# Patient Record
Sex: FEMALE | Race: WHITE | NOT HISPANIC OR LATINO | URBAN - METROPOLITAN AREA
[De-identification: names, ages, dates, MRNs, and addresses within clinical notes are randomized per-mention and may not be internally consistent; named-entity substitution may affect disease eponyms.]

---

## 2023-03-19 ENCOUNTER — EMERGENCY (EMERGENCY)
Facility: HOSPITAL | Age: 33
LOS: 1 days | Discharge: HOME | End: 2023-03-19
Admitting: EMERGENCY MEDICINE
Payer: COMMERCIAL

## 2023-03-19 VITALS
OXYGEN SATURATION: 97 % | WEIGHT: 190.04 LBS | HEIGHT: 65 IN | HEART RATE: 86 BPM | RESPIRATION RATE: 17 BRPM | TEMPERATURE: 98 F | SYSTOLIC BLOOD PRESSURE: 129 MMHG | DIASTOLIC BLOOD PRESSURE: 89 MMHG

## 2023-03-19 PROCEDURE — 73610 X-RAY EXAM OF ANKLE: CPT | Mod: 26,RT

## 2023-03-19 PROCEDURE — 99284 EMERGENCY DEPT VISIT MOD MDM: CPT

## 2023-03-19 PROCEDURE — 73610 X-RAY EXAM OF ANKLE: CPT

## 2023-03-19 PROCEDURE — 99283 EMERGENCY DEPT VISIT LOW MDM: CPT | Mod: 25

## 2023-03-19 RX ORDER — IBUPROFEN 200 MG
600 TABLET ORAL ONCE
Refills: 0 | Status: COMPLETED | OUTPATIENT
Start: 2023-03-19 | End: 2023-03-19

## 2023-03-19 RX ORDER — ONDANSETRON 8 MG/1
8 TABLET, FILM COATED ORAL ONCE
Refills: 0 | Status: COMPLETED | OUTPATIENT
Start: 2023-03-19 | End: 2023-03-19

## 2023-03-19 RX ADMIN — Medication 600 MILLIGRAM(S): at 12:08

## 2023-03-19 RX ADMIN — ONDANSETRON 8 MILLIGRAM(S): 8 TABLET, FILM COATED ORAL at 12:09

## 2023-03-19 NOTE — ED PROVIDER NOTE - CARE PROVIDER_API CALL
Dylan Molina)  Orthopaedic Surgery  [address] [place] [address]  Phone: [phone]  Fax: [phone]  Follow Up Time:

## 2023-03-19 NOTE — ED PROVIDER NOTE - NSFOLLOWUPINSTRUCTIONS_ED_ALL_ED_FT
Take tylenol 650mg or motrin 400-800mg as needed every 4-6 hours for pain.   REST- Rest your hurting/injured joint or extremity to decrease pain and swelling for 24-48 hours    ICE- Apply ice to area of pain to decreased inflammation and pain, put towel/barrier between ice and skin. You can keep ice on for 20 minutes at a time 4-8 times daily   COMPRESSION- Wear ace wrap or brace for support to reduce swelling.  Make sure not to wrap too tight, loosen if skin feeling numb/tingling or skin turns blue   ELEVATION- Elevate hurting/injured area 6 or more inches about level of heart to decrease swelling/inflammation.  Use pillow under joint to elevate area    [facility] (Thursday afternoons) - call [phone] to schedule  You may call our referrals coordinator at [phone] Monday to Friday 11am-7pm for assistance with making an appointment      Nondisplaced Fibular Ankle Fracture Treated With Immobilization       A nondisplaced fibular ankle fracture is a simple break of the bottom of the fibula. The fibula is the smaller of the two bones in the lower leg and is on the outer side of the leg. In a nondisplaced fracture, the pieces of the broken bone line up with each other and are not out of place. This condition usually does not need surgery and can be treated with a splint, cast, or walking boot.      What are the causes?    This condition may be caused by:  •A hard, direct hit or injury to the side of the leg.      •A powerful twisting or rotating movement.      •Rolling the ankle.      •Falling or tripping.        What increases the risk?    The following factors may make you more likely to develop this condition:  •Playing sports that involve a lot of running and pivoting, such as basketball.      •Playing impact sports, such as football or soccer.      •Having diabetes.      •Having a history of ankle fractures.      •Obesity.        What are the signs or symptoms?    Symptoms of this condition include:  •Severe pain that begins right away after your injury.      •Bruising.      •Swelling.      •Being unable to support your body weight with (unable to put weight on) your injured ankle.      •An ankle that is tender to the touch.        How is this diagnosed?    This condition is diagnosed based on:  •Your medical history.      •A physical exam.    •Imaging tests to confirm the fracture and to check the extent of the injury. These tests may include:  •X-rays.      •A stress X-ray. During this test, your health care provider will put pressure on your ankle while taking an X-ray. This will help to determine whether your ankle is stable.      •CT scan.      •MRI.          How is this treated?    This condition may be treated with:  •A splint.      •Icing and raising (elevating) the ankle.      •A cast.      •A removable cast or walking boot.      •Crutches. You may need these to help you walk.      •Taking medicine to relieve pain.        Follow these instructions at home:    If you have a splint, removable cast, or boot:     •Wear the splint, cast, or boot as told by your health care provider. Remove it only as told by your health care provider.       •Loosen it if your toes tingle, become numb, or turn cold and blue.      •Keep it clean and dry.      If you have a non-removable cast:     • Do not put pressure on any part of the cast until it is fully hardened. This may take several hours.      • Do not stick anything inside the cast to scratch your skin. Doing that increases your risk of infection.      •Check the skin around the cast every day. Tell your health care provider about any concerns.      •You may put lotion on dry skin around the edges of the cast. Do not put lotion on the skin underneath the cast.       •Keep the cast clean and dry.      Bathing     • Do not take baths, swim, or use a hot tub until your health care provider approves. Ask your health care provider if you may take showers. You may only be allowed to take sponge baths.    •If the splint, cast, or walking boot is not waterproof:  •Do not let it get wet.      •Cover it with a watertight covering when you take a bath or shower.          Managing pain, stiffness, and swelling    •If directed, put ice on the injured area. To do this:  •If you have a removable splint, cast, or boot, remove it as told by your health care provider.      •Put ice in a plastic bag.      •Place a towel between your skin and the bag, or between your cast and the bag.      •Leave the ice on for 20 minutes, 2–3 times a day.      •Remove the ice if your skin turns bright red. This is very important. If you cannot feel pain, heat, or cold, you have a greater risk of damage to the area.        •Move your toes often to reduce stiffness and swelling.      •Elevate the injured area above the level of your heart while you are sitting or lying down.      Activity     • Do not use the injured leg to support your body weight until your health care provider says that you can. Use crutches as told by your health care provider.      •Resume walking without crutches as told.      •Do exercises and stretches as told by your health care provider.        General instructions      •Take over-the-counter and prescription medicines only as told by your health care provider.    •Ask your health care provider if the medicine prescribed to you:  •Requires you to avoid driving or using machinery.    •Can cause constipation. You may need to take these actions to prevent or treat constipation:  •Take over-the-counter or prescription medicines.      •Eat foods that are high in fiber, such as beans, whole grains, and fresh fruits and vegetables.      •Limit foods that are high in fat and processed sugars, such as fried or sweet foods.          •Ask your health care provider when it is safe to drive if you have a splint, cast, or boot on your ankle.      • Do not use any products that contain nicotine or tobacco, such as cigarettes, e-cigarettes, and chewing tobacco. These can delay bone healing. If you need help quitting, ask your health care provider.      •Keep all follow-up visits. This is important.        Contact a health care provider if:    •Your cast gets damaged or it breaks.      •Your pain does not get better with medicine.        Get help right away if:    •You develop severe pain or more swelling in your ankle, leg, or foot that cannot be controlled with medicines.      •Your skin or nails below the injury turn blue or gray, feel cold, or become numb.      •The skin under your cast burns or stings.      •There is a bad smell or pus coming from under the cast.      •You cannot move your toes.        Summary    •A nondisplaced fibular ankle fracture is a simple break of the bottom of a bone in the lower leg (fibula).      •This condition may be treated with a splint, icing and elevation, a cast, or a removable cast or walking boot. You may also need crutches to help you walk while your ankle heals.      •To help manage pain, stiffness, and swelling, put ice on the injured area as directed by your health care provider.      •You should not use the injured leg to support your body weight until your health care provider says that you can. Use crutches as told by your health care provider.      This information is not intended to replace advice given to you by your health care provider. Make sure you discuss any questions you have with your health care provider.

## 2023-03-19 NOTE — ED PROVIDER NOTE - CLINICAL SUMMARY MEDICAL DECISION MAKING FREE TEXT BOX
33 F pmh Crohns on humira p/w R ankle pain s/p injury.  on exam RLE: + swelling and ttp over lateral malleolus, limited ROM ankle 2/2 pain, no calf ttp, achilles intact, FROM knee/digits, DP/PT pulse 2+, SILT.  xray w/ nondisplaced distal fibular fracture, mortise intact.  placed in CAM/given crutches for NWB and educated on RICE.  f/u with ortho outpt.  discussed strict return parameters

## 2023-03-19 NOTE — ED ADULT NURSE NOTE - OBJECTIVE STATEMENT
33F presents c/o right ankle pain. pt states she stepped off a curb and rolled her ankle. denies LOC or head injury. ankle with swelling and minimal bruising noted. +pulses and +sensation. pt denies previous injury to that ankle. pt speaking in clear, complete sentences.

## 2023-03-19 NOTE — ED PROVIDER NOTE - PHYSICAL EXAMINATION
Gen: well appearing, no acute distress  Skin: warm/dry, no rash noted  Resp: breathing comfortably, speaking in full sentences, no dyspnea  RLE: + swelling and ttp over lateral malleolus, limited ROM ankle 2/2 pain, no calf ttp, achilles intact, FROM knee/digits, DP/PT pulse 2+, SILT   Neuro: alert/oriented, ambulatory

## 2023-03-19 NOTE — ED PROVIDER NOTE - PATIENT PORTAL LINK FT
You can access the FollowMyHealth Patient Portal offered by [facility] by registering at the following website: [email or web address]. By joining [facility]’s FollowMyHealth portal, you will also be able to view your health information using other applications (apps) compatible with our system.

## 2023-03-19 NOTE — ED ADULT NURSE REASSESSMENT NOTE - NS ED NURSE REASSESS COMMENT FT1
CAM boot applied to right foot/leg. pt provided with size appropriate crutches and educated on instructions for ambulation. pt able to teach back appropriate ambulation with assistive device.

## 2023-03-19 NOTE — ED PROVIDER NOTE - OBJECTIVE STATEMENT
33 F pmh Crohns on humira p/w R ankle pain s/p injury.  Patient reports getting out of the cab twisted ankle uneven sidewalk inversion injury.  Able to bear some weight after.  Complaining of pain over the lateral ankle with swelling.  Did not take anything for pain.  Reports feeling nauseous thinks secondary to pain.  Denies fevers chills, calf pain, calf swelling, knee pain, other injuries.

## 2023-03-21 PROBLEM — Z00.00 ENCOUNTER FOR PREVENTIVE HEALTH EXAMINATION: Status: ACTIVE | Noted: 2023-03-21

## 2023-03-22 DIAGNOSIS — K50.90 CROHN'S DISEASE, UNSPECIFIED, WITHOUT COMPLICATIONS: ICD-10-CM

## 2023-03-22 DIAGNOSIS — M25.571 PAIN IN RIGHT ANKLE AND JOINTS OF RIGHT FOOT: ICD-10-CM

## 2023-03-22 DIAGNOSIS — R11.0 NAUSEA: ICD-10-CM

## 2023-03-22 DIAGNOSIS — S82.831A OTHER FRACTURE OF UPPER AND LOWER END OF RIGHT FIBULA, INITIAL ENCOUNTER FOR CLOSED FRACTURE: ICD-10-CM
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
